# Patient Record
Sex: MALE | Race: WHITE | NOT HISPANIC OR LATINO | Employment: STUDENT | URBAN - METROPOLITAN AREA
[De-identification: names, ages, dates, MRNs, and addresses within clinical notes are randomized per-mention and may not be internally consistent; named-entity substitution may affect disease eponyms.]

---

## 2022-07-17 ENCOUNTER — OFFICE VISIT (OUTPATIENT)
Dept: URGENT CARE | Facility: URGENT CARE | Age: 22
End: 2022-07-17
Payer: COMMERCIAL

## 2022-07-17 VITALS
DIASTOLIC BLOOD PRESSURE: 78 MMHG | BODY MASS INDEX: 20.76 KG/M2 | TEMPERATURE: 98.7 F | HEART RATE: 90 BPM | HEIGHT: 70 IN | WEIGHT: 145 LBS | OXYGEN SATURATION: 99 % | SYSTOLIC BLOOD PRESSURE: 142 MMHG

## 2022-07-17 DIAGNOSIS — N45.1 EPIDIDYMITIS: Primary | ICD-10-CM

## 2022-07-17 PROCEDURE — 87491 CHLMYD TRACH DNA AMP PROBE: CPT | Performed by: PHYSICIAN ASSISTANT

## 2022-07-17 PROCEDURE — 87591 N.GONORRHOEAE DNA AMP PROB: CPT | Performed by: PHYSICIAN ASSISTANT

## 2022-07-17 PROCEDURE — 99203 OFFICE O/P NEW LOW 30 MIN: CPT | Performed by: PHYSICIAN ASSISTANT

## 2022-07-17 RX ORDER — CIPROFLOXACIN 500 MG/1
500 TABLET, FILM COATED ORAL 2 TIMES DAILY
Qty: 20 TABLET | Refills: 0 | Status: SHIPPED | OUTPATIENT
Start: 2022-07-17 | End: 2022-07-27

## 2022-07-17 RX ORDER — NAPROXEN 500 MG/1
500 TABLET ORAL
Qty: 21 TABLET | Refills: 0 | Status: SHIPPED | OUTPATIENT
Start: 2022-07-17 | End: 2022-07-24

## 2022-07-17 NOTE — PROGRESS NOTES
Epididymitis  - Neisseria gonorrhoeae PCR - Clinic Collect  - Chlamydia trachomatis PCR  - naproxen (NAPROSYN) 500 MG tablet; Take 1 tablet (500 mg) by mouth 3 times daily (with meals) for 7 days  - ciprofloxacin (CIPRO) 500 MG tablet; Take 1 tablet (500 mg) by mouth 2 times daily for 10 days        Epididymitis   Inflammation of the epididymis can cause pain and swelling in your scrotum. The epididymis is a small tube next to the testicle that stores sperm. Epididymitis is often caused by an infection. In sexually active men, it is often caused by a sexually transmitted infection (STI) such as chlamydia or gonorrhea. In boys and in men over 40, it can be from bacteria from other parts of the urinary tract (not an STI infection).  Symptoms may begin with pain in the lower belly (abdomen) or low back. The pain then spreads down into the scrotum. Often only one side is affected. The testicle and scrotum swell and become very painful and red. You may have fever and a burning when passing urine. Sometimes you may have a discharge from the penis.  Treatment is with antibiotics, and anti-inflammatory and pain medicines. The condition should get better over the first few days of treatment. But it will take several weeks for all the swelling and mild pain to go away. If your healthcare provider thinks that an STI is the cause, your sexual partners may need to be treated.  Home care  Here are some tips to help you care for yourself at home:    Support the scrotum. When lying down, place a rolled towel under the scrotum. When walking, use an athletic supporter or 2 pairs of jockey-style underwear.    To ease pain, put ice packs on the inflamed area. To make an ice pack, put ice cubes in a plastic bag that seals at the top. Wrap the bag in a clean, thin towel. Never put an ice pack directly on the skin.    Take pain medicine as directed. You may use over-the-counter medicines to control pain, unless another medicine was  given. If you have long-term (chronic) liver or kidney disease, talk with your healthcare provider before taking these medicines. Also talk with your provider if you've ever had a stomach ulcer or GI (gastrointestinal) bleeding.    Get some rest.  Rest in bed for the first few days until the fever, pain, and swelling get better. It may take several weeks for all of the swelling to go away.    Prevent constipation. Constipation can make you strain. This makes the pain worse. Prevent constipation by eating natural laxatives. These include prunes, fresh fruits, and whole-grain cereals. If needed, use a mild over-the-counter laxative for constipation. Mineral oil can be used to keep the stools soft.    Wait to have sex. Don't have sex until you have finished all treatment and all symptoms have cleared.    Take all medicine as directed. Don't miss any doses. And don't stop taking your medicine early, even if you feel better.  Follow-up care  Follow up with your healthcare provider, or as advised, to be sure you are responding correctly to treatment. If a culture was taken, you may call for the result as directed. A culture test can ensure that you are on the correct antibiotic.   When to seek medical advice  Call your healthcare provider right away if any of these occur:    Fever of 100.4 F (38 C) or higher, or as directed by your provider    More pain or swelling of the testicle after starting treatment    Pressure or pain in your bladder that gets worse    Unable to pass urine for 8 hours  [employer] last reviewed this educational content on 9/1/2019 2000-2021 [employer]. All rights reserved. This information is not intended as a substitute for professional medical care. Always follow your healthcare professional's instructions.                 MANUEL Perez [facility]    Subjective   22 year old who presents to clinic today for the following health issues:    Urgent Care and  Groin Swelling       HPI     Genitourinary - Male  Onset/Duration: Patient states that he has swelling in his right testicle since Friday night- Stating about the same. Patient states that he has noticed swelling on the back side of the testicle mostly.   Description:   Dysuria (painful urination): No}  Hematuria (blood in urine): No  Frequency: Yes, but patient has been drinking more water lately.   Waking at night to urinate: No  Hesitancy (delay in urine): No  Retention (unable to empty): No  Decrease in urinary flow: No  Incontinence: No  Progression of Symptoms:  same  Accompanying Signs & Symptoms:  Fever: No  Back/Flank pain: No  Urethral discharge: No  Testicle lumps/masses/pain: YES- On the back side of the testicle   Nausea and/or vomiting: No  Abdominal pain: No  History:   History of frequent UTI s: No  History of kidney stones: No  History of hernias: No  Personal or Family history of Prostate problems: No  Sexually active: YES- One partner - Partner is not symptomatic for anything   Precipitating or alleviating factors: Patient states that he may have been kneed in the groin in the middle of the night.- Patient reports the pain began 1-2 days after that.   Therapies tried and outcome: Ice and ibuprofen.      Pain level is currently a 1/10 currently.     Review of Systems   Review of Systems   See HPI     Objective    Temp: 98.7  F (37.1  C) Temp src: Temporal BP: (!) 142/78 Pulse: 90     SpO2: 99 %       Physical Exam   Physical Exam  Constitutional:       General: He is not in acute distress.     Appearance: Normal appearance. He is normal weight. He is not ill-appearing, toxic-appearing or diaphoretic.   HENT:      Head: Normocephalic and atraumatic.   Cardiovascular:      Rate and Rhythm: Normal rate.      Pulses: Normal pulses.   Pulmonary:      Effort: Pulmonary effort is normal. No respiratory distress.   Genitourinary:     Epididymis:      Right: Inflamed and enlarged. Tenderness present.       Left: Normal. Not inflamed or enlarged. No mass or tenderness.   Neurological:      Mental Status: He is alert.   Psychiatric:         Mood and Affect: Mood normal.         Behavior: Behavior normal.         Thought Content: Thought content normal.         Judgment: Judgment normal.          No results found for this or any previous visit (from the past 24 hour(s)).

## 2022-07-19 LAB
C TRACH DNA SPEC QL NAA+PROBE: NEGATIVE
N GONORRHOEA DNA SPEC QL NAA+PROBE: NEGATIVE

## 2023-08-18 ENCOUNTER — TRANSFERRED RECORDS (OUTPATIENT)
Dept: HEALTH INFORMATION MANAGEMENT | Facility: CLINIC | Age: 23
End: 2023-08-18

## 2023-08-18 LAB
ALT SERPL-CCNC: 29 U/L (ref 16–63)
AST SERPL-CCNC: 26 U/L (ref 0–55)

## 2023-09-19 ENCOUNTER — MEDICAL CORRESPONDENCE (OUTPATIENT)
Dept: HEALTH INFORMATION MANAGEMENT | Facility: CLINIC | Age: 23
End: 2023-09-19
Payer: COMMERCIAL

## 2023-09-20 ENCOUNTER — TRANSCRIBE ORDERS (OUTPATIENT)
Dept: OTHER | Age: 23
End: 2023-09-20

## 2023-09-20 DIAGNOSIS — L60.3 DYSTROPHIC NAIL: Primary | ICD-10-CM

## 2024-02-01 ENCOUNTER — OFFICE VISIT (OUTPATIENT)
Dept: DERMATOLOGY | Facility: CLINIC | Age: 24
End: 2024-02-01
Payer: COMMERCIAL

## 2024-02-01 DIAGNOSIS — B35.3 TINEA PEDIS OF RIGHT FOOT: ICD-10-CM

## 2024-02-01 DIAGNOSIS — B35.1 ONYCHOMYCOSIS: Primary | ICD-10-CM

## 2024-02-01 PROCEDURE — 99204 OFFICE O/P NEW MOD 45 MIN: CPT | Performed by: STUDENT IN AN ORGANIZED HEALTH CARE EDUCATION/TRAINING PROGRAM

## 2024-02-01 RX ORDER — KETOCONAZOLE 20 MG/G
CREAM TOPICAL DAILY
Qty: 60 G | Refills: 4 | Status: SHIPPED | OUTPATIENT
Start: 2024-02-01 | End: 2024-06-04

## 2024-02-01 RX ORDER — TERBINAFINE HYDROCHLORIDE 250 MG/1
250 TABLET ORAL DAILY
Qty: 84 TABLET | Refills: 1 | Status: SHIPPED | OUTPATIENT
Start: 2024-02-01

## 2024-02-01 ASSESSMENT — PAIN SCALES - GENERAL: PAINLEVEL: NO PAIN (0)

## 2024-02-01 NOTE — PROGRESS NOTES
"[facility] Dermatology Note    Encounter Date: Feb 1, 2024    Dermatology Problem List:    ______________________________________    Impression/Plan:  Maycol was seen today for derm problem.    Diagnoses and all orders for this visit:    Onychomycosis  -     terbinafine (LAMISIL) 250 MG tablet; Take 1 tablet (250 mg) by mouth daily  - R foot  - x12 weeks  - re eval in 4 mo     Tinea pedis of right foot  -     ketoconazole (NIZORAL) 2 % external cream; Apply topically daily  - BID x2 weeks then 3x weekly thereafter       Follow-up in 4 mo .       Staff Involved:  Staff Only    Willis Lopez MD   of Dermatology  Department of Dermatology  [facility]      CC:   Chief Complaint   Patient presents with    Derm Problem     2-3 years: \"yellowed toenails\" on R foot, per patient. Referred by [facility]       History of Present Illness:  Mr. Maycol Lucas is a 23 year old male who presents as a new patient.    Has scaly skin on R foot and dystrophic yellowed nails on the same foot. Previously tried topicals didn't work. Had a clipping which was negative. Interested in oral therapy    Labs:      Physical exam:  Vitals: There were no vitals taken for this visit.  GEN: well developed, well-nourished, in no acute distress, in a pleasant mood.     SKIN: Pascal phototype 1  - Focused examination of the R foot was performed.  - yellowing of some toenails w/ subungual debris  - superficial scaling of feet in moccasin distribution  - No other lesions of concern on areas examined.     Past Medical History:   No past medical history on file.  No past surgical history on file.    Social History:   reports that he has been smoking. He has never used smokeless tobacco.    Family History:  No family history on file.    Medications:  Current Outpatient Medications   Medication Sig Dispense Refill    ketoconazole (NIZORAL) 2 % external cream Apply topically " daily 60 g 4    terbinafine (LAMISIL) 250 MG tablet Take 1 tablet (250 mg) by mouth daily 84 tablet 1     No Known Allergies

## 2024-02-01 NOTE — LETTER
"2/1/2024       RE: Maycol Lucas  [address]  [place] [address]     Dear Colleague,    Thank you for referring your patient, Maycol Lucas, to the [facility] [place] [facility]. Please see a copy of my visit note below.    [facility] Dermatology Note    Encounter Date: Feb 1, 2024    Dermatology Problem List:    ______________________________________    Impression/Plan:  Maycol was seen today for derm problem.    Diagnoses and all orders for this visit:    Onychomycosis  -     terbinafine (LAMISIL) 250 MG tablet; Take 1 tablet (250 mg) by mouth daily  - R foot  - x12 weeks  - re eval in 4 mo     Tinea pedis of right foot  -     ketoconazole (NIZORAL) 2 % external cream; Apply topically daily  - BID x2 weeks then 3x weekly thereafter       Follow-up in 4 mo .       Staff Involved:  Staff Only    Willis Lopez MD   of Dermatology  Department of Dermatology  [facility]      CC:   Chief Complaint   Patient presents with    Derm Problem     2-3 years: \"yellowed toenails\" on R foot, per patient. Referred by [facility]       History of Present Illness:  Mr. Maycol Lucas is a 23 year old male who presents as a new patient.    Has scaly skin on R foot and dystrophic yellowed nails on the same foot. Previously tried topicals didn't work. Had a clipping which was negative. Interested in oral therapy    Labs:      Physical exam:  Vitals: There were no vitals taken for this visit.  GEN: well developed, well-nourished, in no acute distress, in a pleasant mood.     SKIN: Pascal phototype 1  - Focused examination of the R foot was performed.  - yellowing of some toenails w/ subungual debris  - superficial scaling of feet in moccasin distribution  - No other lesions of concern on areas examined.     Past Medical History:   No past medical " history on file.  No past surgical history on file.    Social History:   reports that he has been smoking. He has never used smokeless tobacco.    Family History:  No family history on file.    Medications:  Current Outpatient Medications   Medication Sig Dispense Refill    ketoconazole (NIZORAL) 2 % external cream Apply topically daily 60 g 4    terbinafine (LAMISIL) 250 MG tablet Take 1 tablet (250 mg) by mouth daily 84 tablet 1     No Known Allergies

## 2024-02-01 NOTE — NURSING NOTE
"Dermatology Rooming Note    Maycol Lucas's goals for this visit include:   Chief Complaint   Patient presents with    Derm Problem     2-3 years: \"yellowed toenails\" per patient. Referred by [facility]     Sakina Ames LPN    "

## 2024-03-10 ENCOUNTER — HEALTH MAINTENANCE LETTER (OUTPATIENT)
Age: 24
End: 2024-03-10

## 2024-06-04 ENCOUNTER — OFFICE VISIT (OUTPATIENT)
Dept: DERMATOLOGY | Facility: CLINIC | Age: 24
End: 2024-06-04
Attending: STUDENT IN AN ORGANIZED HEALTH CARE EDUCATION/TRAINING PROGRAM
Payer: COMMERCIAL

## 2024-06-04 DIAGNOSIS — B35.3 TINEA PEDIS OF RIGHT FOOT: Primary | ICD-10-CM

## 2024-06-04 DIAGNOSIS — B35.1 ONYCHOMYCOSIS: ICD-10-CM

## 2024-06-04 PROCEDURE — 99214 OFFICE O/P EST MOD 30 MIN: CPT | Performed by: STUDENT IN AN ORGANIZED HEALTH CARE EDUCATION/TRAINING PROGRAM

## 2024-06-04 RX ORDER — KETOCONAZOLE 20 MG/G
CREAM TOPICAL DAILY
Qty: 60 G | Refills: 11 | Status: SHIPPED | OUTPATIENT
Start: 2024-06-04

## 2024-06-04 NOTE — LETTER
6/4/2024       RE: Maycol Lucas  [address]  [place] [address]     Dear Colleague,    Thank you for referring your patient, Maycol Lucas, to the [facility] [place] at [facility]. Please see a copy of my visit note below.    [facility] Dermatology Note    Encounter Date: Jun 4, 2024    Dermatology Problem List:  \  ______________________________________    Impression/Plan:  Maycol was seen today for derm problem.    Diagnoses and all orders for this visit:    Tinea pedis of right foot  -     ketoconazole (NIZORAL) 2 % external cream; Apply topically daily  Onychomycosis  -Doing well on second course of terbinafine with healthy proximal regrowth  - Continue ketoconazole cream twice weekly indefinitely to prevent recurrence of    Other orders  -     Adult Dermatology Clinic Follow-Up Order (Blank)            Follow-up PRN.       Staff Involved:  Staff Only    Willis Lopez MD   of Dermatology  Department of Dermatology  [facility]      CC:   Chief Complaint   Patient presents with    Derm Problem     Onychomycosis 4 month recheck- he continues to take terbinafine and he reports improvement        History of Present Illness:  Mr. Maycol Lucas is a 23 year old male who presents as a new patient.    Previously completed 1 course of terbinafine x 12 weeks, and is currently on his second.  Using the ketoconazole cream intermittently.  Has questions about how long he needs to keep up this regimen for    Labs:    Physical exam:  Vitals: There were no vitals taken for this visit.  GEN: well developed, well-nourished, in no acute distress, in a pleasant mood.     SKIN: Pascal phototype 1  - Focused examination of the feet was performed.  -Normal proximal regrowth of multiple toenails  - No other lesions of concern on areas examined.     Past  Medical History:   No past medical history on file.  No past surgical history on file.    Social History:   reports that he has been smoking. He has never used smokeless tobacco.    Family History:  No family history on file.    Medications:  Current Outpatient Medications   Medication Sig Dispense Refill    ketoconazole (NIZORAL) 2 % external cream Apply topically daily 60 g 11    terbinafine (LAMISIL) 250 MG tablet Take 1 tablet (250 mg) by mouth daily 84 tablet 1     No Known Allergies

## 2024-06-04 NOTE — PROGRESS NOTES
[facility] Dermatology Note    Encounter Date: Jun 4, 2024    Dermatology Problem List:  \  ______________________________________    Impression/Plan:  Maycol was seen today for derm problem.    Diagnoses and all orders for this visit:    Tinea pedis of right foot  -     ketoconazole (NIZORAL) 2 % external cream; Apply topically daily  Onychomycosis  -Doing well on second course of terbinafine with healthy proximal regrowth  - Continue ketoconazole cream twice weekly indefinitely to prevent recurrence of    Other orders  -     Adult Dermatology Clinic Follow-Up Order (Blank)            Follow-up PRN.       Staff Involved:  Staff Only    Willis Lopez MD   of Dermatology  Department of Dermatology  [facility] [employer] [facility]      CC:   Chief Complaint   Patient presents with    Derm Problem     Onychomycosis 4 month recheck- he continues to take terbinafine and he reports improvement          History of Present Illness:  Mr. Maycol Lucas is a 23 year old male who presents as a new patient.    Previously completed 1 course of terbinafine x 12 weeks, and is currently on his second.  Using the ketoconazole cream intermittently.  Has questions about how long he needs to keep up this regimen for    Labs:      Physical exam:  Vitals: There were no vitals taken for this visit.  GEN: well developed, well-nourished, in no acute distress, in a pleasant mood.     SKIN: Pascal phototype 1  - Focused examination of the feet was performed.  -Normal proximal regrowth of multiple toenails  - No other lesions of concern on areas examined.     Past Medical History:   No past medical history on file.  No past surgical history on file.    Social History:   reports that he has been smoking. He has never used smokeless tobacco.    Family History:  No family history on file.    Medications:  Current Outpatient Medications   Medication Sig Dispense Refill    ketoconazole  (NIZORAL) 2 % external cream Apply topically daily 60 g 11    terbinafine (LAMISIL) 250 MG tablet Take 1 tablet (250 mg) by mouth daily 84 tablet 1     No Known Allergies

## 2024-06-04 NOTE — NURSING NOTE
Dermatology Rooming Note    Maycol Lucas's goals for this visit include:   Chief Complaint   Patient presents with    Derm Problem     Onychomycosis 4 month recheck- he continues to take terbinafine and he reports improvement          Jer[place] NAILS CMA

## 2025-03-16 ENCOUNTER — HEALTH MAINTENANCE LETTER (OUTPATIENT)
Age: 25
End: 2025-03-16